# Patient Record
Sex: MALE | Race: WHITE | Employment: FULL TIME | URBAN - METROPOLITAN AREA
[De-identification: names, ages, dates, MRNs, and addresses within clinical notes are randomized per-mention and may not be internally consistent; named-entity substitution may affect disease eponyms.]

---

## 2023-10-18 ENCOUNTER — LAB ENCOUNTER (OUTPATIENT)
Dept: LAB | Age: 53
End: 2023-10-18
Payer: COMMERCIAL

## 2023-10-18 DIAGNOSIS — E87.0 HYPEROSMOLALITY AND/OR HYPERNATREMIA: Primary | ICD-10-CM

## 2023-10-18 DIAGNOSIS — E87.5 HYPERPOTASSEMIA: ICD-10-CM

## 2023-10-18 LAB
POTASSIUM SERPL-SCNC: 4.2 MMOL/L (ref 3.5–5.1)
SODIUM SERPL-SCNC: 138 MMOL/L (ref 136–145)

## 2023-10-18 PROCEDURE — 36415 COLL VENOUS BLD VENIPUNCTURE: CPT

## 2023-10-18 PROCEDURE — 84132 ASSAY OF SERUM POTASSIUM: CPT

## 2023-10-18 PROCEDURE — 84295 ASSAY OF SERUM SODIUM: CPT

## 2023-11-22 ENCOUNTER — HOSPITAL ENCOUNTER (OUTPATIENT)
Age: 53
Discharge: HOME | End: 2023-11-22
Payer: COMMERCIAL

## 2023-11-22 VITALS
WEIGHT: 298 LBS | BODY MASS INDEX: 42.66 KG/M2 | HEIGHT: 70 IN | RESPIRATION RATE: 20 BRPM | SYSTOLIC BLOOD PRESSURE: 129 MMHG | HEART RATE: 103 BPM | OXYGEN SATURATION: 96 % | TEMPERATURE: 98 F | DIASTOLIC BLOOD PRESSURE: 74 MMHG

## 2023-11-22 DIAGNOSIS — M10.9 ACUTE GOUT OF RIGHT ANKLE, UNSPECIFIED CAUSE: ICD-10-CM

## 2023-11-22 DIAGNOSIS — H66.002 NON-RECURRENT ACUTE SUPPURATIVE OTITIS MEDIA OF LEFT EAR WITHOUT SPONTANEOUS RUPTURE OF TYMPANIC MEMBRANE: Primary | ICD-10-CM

## 2023-11-22 PROCEDURE — 99213 OFFICE O/P EST LOW 20 MIN: CPT

## 2023-11-22 PROCEDURE — 99204 OFFICE O/P NEW MOD 45 MIN: CPT

## 2023-11-22 RX ORDER — AMOXICILLIN AND CLAVULANATE POTASSIUM 875; 125 MG/1; MG/1
1 TABLET, FILM COATED ORAL 2 TIMES DAILY
Qty: 14 TABLET | Refills: 0 | Status: SHIPPED | OUTPATIENT
Start: 2023-11-22 | End: 2023-11-29

## 2023-11-22 RX ORDER — ROSUVASTATIN CALCIUM 20 MG/1
20 TABLET, COATED ORAL DAILY
COMMUNITY
Start: 2023-03-18

## 2023-11-22 RX ORDER — METHYLPREDNISOLONE 4 MG/1
TABLET ORAL
Qty: 1 EACH | Refills: 0 | Status: SHIPPED | OUTPATIENT
Start: 2023-11-22

## 2023-11-22 RX ORDER — ALLOPURINOL 100 MG/1
100 TABLET ORAL DAILY
COMMUNITY
Start: 2023-02-03

## 2023-11-22 RX ORDER — ASPIRIN 81 MG/1
81 TABLET, CHEWABLE ORAL DAILY
COMMUNITY

## 2023-11-22 RX ORDER — METOPROLOL SUCCINATE 25 MG/1
25 TABLET, EXTENDED RELEASE ORAL DAILY
COMMUNITY
Start: 2023-08-31

## 2023-11-22 RX ORDER — LISINOPRIL 20 MG/1
20 TABLET ORAL DAILY
COMMUNITY

## 2023-11-22 RX ORDER — NITROGLYCERIN 0.4 MG/1
1 TABLET SUBLINGUAL AS NEEDED
COMMUNITY
Start: 2023-08-31

## 2023-11-22 RX ORDER — AMLODIPINE BESYLATE 10 MG/1
10 TABLET ORAL DAILY
COMMUNITY
Start: 2023-08-31

## 2023-11-22 NOTE — ED INITIAL ASSESSMENT (HPI)
C/o left ear clogged since today. Pt reports he just flew in from [place]. Pt reports he feels pain when he tries to pop his ear. Pt denies use of cotton swabs. Pt reports he takes meds for gout and he missed dose yesterday and eat/drank food he was not suppose to and now he has right ankle pain. Pt reports he started gout med again today. Pt denies otc meds used.

## 2023-11-22 NOTE — DISCHARGE INSTRUCTIONS
Resume gout diet restrictions. Take steroid taper as written. Take Augmentin as written.   Follow-up with the provided ENT specialist

## 2023-11-24 ENCOUNTER — HOSPITAL ENCOUNTER (OUTPATIENT)
Age: 53
Discharge: HOME | End: 2023-11-24
Attending: EMERGENCY MEDICINE
Payer: COMMERCIAL

## 2023-11-24 VITALS
OXYGEN SATURATION: 98 % | DIASTOLIC BLOOD PRESSURE: 86 MMHG | TEMPERATURE: 96 F | RESPIRATION RATE: 18 BRPM | HEART RATE: 68 BPM | SYSTOLIC BLOOD PRESSURE: 129 MMHG

## 2023-11-24 DIAGNOSIS — H65.92 LEFT NON-SUPPURATIVE OTITIS MEDIA: Primary | ICD-10-CM

## 2023-11-24 PROCEDURE — 90471 IMMUNIZATION ADMIN: CPT

## 2023-11-24 PROCEDURE — 99212 OFFICE O/P EST SF 10 MIN: CPT

## 2023-11-24 PROCEDURE — 99213 OFFICE O/P EST LOW 20 MIN: CPT

## 2023-11-24 NOTE — ED INITIAL ASSESSMENT (HPI)
C/o currently on antibiotic for left ear infection dx on 11/22/23. Wants to know if ok to fly via airplane back to [facility]. Right hand with puncture wound yesterday possible from [employer]-requesting tetanus vaccine.

## 2024-02-20 ENCOUNTER — HOSPITAL ENCOUNTER (OUTPATIENT)
Age: 54
Discharge: HOME | End: 2024-02-20
Attending: EMERGENCY MEDICINE
Payer: COMMERCIAL

## 2024-02-20 VITALS
OXYGEN SATURATION: 96 % | WEIGHT: 296 LBS | HEIGHT: 70 IN | BODY MASS INDEX: 42.37 KG/M2 | TEMPERATURE: 97 F | SYSTOLIC BLOOD PRESSURE: 119 MMHG | HEART RATE: 83 BPM | DIASTOLIC BLOOD PRESSURE: 80 MMHG | RESPIRATION RATE: 20 BRPM

## 2024-02-20 DIAGNOSIS — U07.1 COVID-19: Primary | ICD-10-CM

## 2024-02-20 LAB — SARS-COV-2 RNA RESP QL NAA+PROBE: DETECTED

## 2024-02-20 PROCEDURE — 99213 OFFICE O/P EST LOW 20 MIN: CPT

## 2024-02-20 NOTE — ED INITIAL ASSESSMENT (HPI)
Patient states he tested positive for Covid on 2/18/24 and is here today for a repeat test, as he is trying to fly home to [place]. States he started on 2/16 with symptoms and has started feeling better.

## 2024-02-20 NOTE — DISCHARGE INSTRUCTIONS
Self quarantine for minimum 5 days from the onset of symptoms over-the-counter cough or cold medications as needed return any problems.

## 2024-02-20 NOTE — ED PROVIDER NOTES
Patient Seen in: [facility]      History     Chief Complaint   Patient presents with    Covid-19 Test     Stated Complaint: covid test    Subjective:   HPI    53-year-old male here for possible COVID the patient states his symptoms started 3 days ago has had some mild runny nose low-grade fever he is already starting to feel better he had a positive test at home states that in the past he got Paxil then a year and a half ago he has a history of diabetes.  He is feeling pretty well right now denies any shortness of breath or vomiting.    Objective:   Past Medical History:   Diagnosis Date    Obesity, unspecified     Other and unspecified hyperlipidemia     Sleep apnea     Unspecified essential hypertension               Past Surgical History:   Procedure Laterality Date    REPAIR/GRAFT ACHILLES TENDON                  Social History     Socioeconomic History    Marital status: Single   Tobacco Use    Smoking status: Former    Smokeless tobacco: Never   Vaping Use    Vaping Use: Never used   Substance and Sexual Activity    Alcohol use: Yes     Comment: social    Drug use: No              Review of Systems    Positive for stated complaint: covid test  Other systems are as noted in HPI.  Constitutional and vital signs reviewed.      All other systems reviewed and negative except as noted above.    Physical Exam     ED Triage Vitals [02/20/24 1153]   BP (!) 165/86   Pulse 83   Resp 20   Temp 97.2 °F (36.2 °C)   Temp src Temporal   SpO2 96 %   O2 Device None (Room air)       Current:/80   Pulse 83   Temp 97.2 °F (36.2 °C) (Temporal)   Resp 20   Ht 177.8 cm (5' 10\")   Wt 134.3 kg   SpO2 96%   BMI 42.47 kg/m²         Physical Exam    Patient is alert orient x 3 no acute distress HEENT exam within normal limits neck there is no lymphadenopathy or JVD lungs are clear cardiovascular exam shows regular rate and rhythm without murmurs abdomen soft and nontender.    ED Course     Labs Reviewed    RAPID SARS-COV-2 BY PCR - Abnormal; Notable for the following components:       Result Value    Rapid SARS-CoV-2 by PCR Detected (*)     All other components within normal limits             COVID is positive         MDM      Initial differential diagnosis includes COVID flu nonspecific viral infection.    Patient appears well with minimal symptoms does have diabetes but no respiratory complaints I do not think this warrants Paxlovid I discussed this with the patient advised over-the-counter medicines follow-up doctor in a few days return if worse.                               Medical Decision Making      Disposition and Plan     Clinical Impression:  1. COVID-19         Disposition:  Discharge  2/20/2024 12:47 pm    Follow-up:  [facility], [address] [address] [phone]    As needed          Medications Prescribed:  Current Discharge Medication List

## 2024-02-21 RX ORDER — NIRMATRELVIR AND RITONAVIR 300-100 MG
KIT ORAL
Qty: 30 TABLET | Refills: 0 | Status: SHIPPED | OUTPATIENT
Start: 2024-02-21 | End: 2024-02-26